# Patient Record
Sex: FEMALE | Race: WHITE | NOT HISPANIC OR LATINO
[De-identification: names, ages, dates, MRNs, and addresses within clinical notes are randomized per-mention and may not be internally consistent; named-entity substitution may affect disease eponyms.]

---

## 2023-09-22 ENCOUNTER — APPOINTMENT (OUTPATIENT)
Dept: ORTHOPEDIC SURGERY | Facility: CLINIC | Age: 68
End: 2023-09-22
Payer: COMMERCIAL

## 2023-09-22 VITALS — HEIGHT: 62 IN | WEIGHT: 190 LBS | BODY MASS INDEX: 34.96 KG/M2

## 2023-09-22 DIAGNOSIS — Z78.9 OTHER SPECIFIED HEALTH STATUS: ICD-10-CM

## 2023-09-22 PROCEDURE — 73564 X-RAY EXAM KNEE 4 OR MORE: CPT | Mod: 50

## 2023-09-22 PROCEDURE — 99203 OFFICE O/P NEW LOW 30 MIN: CPT

## 2023-11-10 ENCOUNTER — APPOINTMENT (OUTPATIENT)
Dept: ORTHOPEDIC SURGERY | Facility: CLINIC | Age: 68
End: 2023-11-10
Payer: COMMERCIAL

## 2023-11-10 VITALS — WEIGHT: 190 LBS | BODY MASS INDEX: 34.96 KG/M2 | HEIGHT: 62 IN

## 2023-11-10 PROCEDURE — 99213 OFFICE O/P EST LOW 20 MIN: CPT

## 2024-01-12 ENCOUNTER — APPOINTMENT (OUTPATIENT)
Dept: ORTHOPEDIC SURGERY | Facility: CLINIC | Age: 69
End: 2024-01-12
Payer: COMMERCIAL

## 2024-01-12 VITALS — BODY MASS INDEX: 34.96 KG/M2 | WEIGHT: 190 LBS | HEIGHT: 62 IN

## 2024-01-12 PROCEDURE — 99214 OFFICE O/P EST MOD 30 MIN: CPT

## 2024-01-12 RX ORDER — MELOXICAM 15 MG/1
15 TABLET ORAL DAILY
Qty: 30 | Refills: 1 | Status: COMPLETED | COMMUNITY
Start: 2024-01-12 | End: 2024-03-12

## 2024-01-12 NOTE — HISTORY OF PRESENT ILLNESS
[3] : 3 [0] : 0 [Dull/Aching] : dull/aching [Intermittent] : intermittent [Household chores] : household chores [Leisure] : leisure [Nothing helps with pain getting better] : Nothing helps with pain getting better [Standing] : standing [Walking] : walking [de-identified] : 9/22/23: Patient is here for bilateral knee pain that began over 5 years ago, not due to injury. Right is worse than left. No swelling. Pain is anterior. Clicking/buckling with walking/standing. Worsens with lateral movement/bending. No prior injury. At the time where pain began, sent for PT - never tried. Never tried CSI or gel injections. Takes Leonel ISSA.   11/10/23 pt comes in to follow up on both her knees. She states her condition has improved since last visit. She is doing physical therapy 2x a week @ [facility]   01/12/2024: here for a follow up visit for BL knees. States pain has improved. Going to PT.  [] : no [FreeTextEntry1] : bilateral knees

## 2024-01-12 NOTE — IMAGING
[de-identified] : Bilat knee No effusion, no warmth, no ecchymosis Medial joint line tenderness to palpation  Range of motion 0-130 with mild anterior crepitus 5/5 quadriceps and hamstring strength Ligamentously stable Motor and sensory intact distally Non antalgic gait Negative Santosh

## 2024-01-12 NOTE — ASSESSMENT
[FreeTextEntry1] : Estimation of chronic bilateral arthritis.  Making small incremental improvements with physical therapy.  Prescription renewed.  Prescription meloxicam sent to the pharmacy to alleviate the night symptoms.  She is hesitant about any cortisone or viscosupplementation.  Follow-up in 3 months to reassess   The patient's current medication management of their orthopedic diagnosis was reviewed today:(1) We discussed a comprehensive treatment plan that included pharmaceutical management involving the use of prescription medications. (2) There is a moderate risk of morbidity with further treatment, especially from use of prescription strength medications and possible side effects of these medications which include upset stomach with oral medications, skin reactions to topical medications and cardiac/renal/diabetes issues with long term use. (3) I recommended that the patient follow-up with their medical physician to discuss any significant specific potential issues with long term medication use such as interactions with current medications or with exacerbation of underlying medical comorbidities. (4) The benefits and risks associated with use of injectable, oral or topical, prescription and over the counter anti-inflammatory medications were discussed with the patient. The patient voiced understanding of the risks including but not limited to bleeding, stroke, kidney dysfunction, heart disease, and were referred to the black box warning label for further information.

## 2024-05-10 ENCOUNTER — APPOINTMENT (OUTPATIENT)
Dept: ORTHOPEDIC SURGERY | Facility: CLINIC | Age: 69
End: 2024-05-10
Payer: COMMERCIAL

## 2024-05-10 DIAGNOSIS — M17.11 UNILATERAL PRIMARY OSTEOARTHRITIS, RIGHT KNEE: ICD-10-CM

## 2024-05-10 DIAGNOSIS — M17.12 UNILATERAL PRIMARY OSTEOARTHRITIS, LEFT KNEE: ICD-10-CM

## 2024-05-10 DIAGNOSIS — E66.9 OBESITY, UNSPECIFIED: ICD-10-CM

## 2024-05-10 PROCEDURE — 99213 OFFICE O/P EST LOW 20 MIN: CPT

## 2024-05-10 NOTE — IMAGING
[de-identified] : Bilat knee: No effusion, no warmth, no ecchymosis Medial joint line tenderness to palpation  Range of motion 0-120 with mild anterior crepitus 5/5 quadriceps and hamstring strength Ligamentously stable Motor and sensory intact distally Non antalgic gait Negative Santosh

## 2024-05-10 NOTE — ASSESSMENT
[FreeTextEntry1] : Presents for follow-up bilateral arthritis.  Has not been on any medications and no longer in therapy.  Symptoms are manageable.  She will contact me as needed at this time.  Discussed corticosteroid injection and viscosupplementation if needed

## 2024-05-10 NOTE — HISTORY OF PRESENT ILLNESS
[3] : 3 [0] : 0 [Dull/Aching] : dull/aching [Intermittent] : intermittent [Household chores] : household chores [Leisure] : leisure [Nothing helps with pain getting better] : Nothing helps with pain getting better [Standing] : standing [Walking] : walking [de-identified] : 9/22/23: Patient is here for bilateral knee pain that began over 5 years ago, not due to injury. Right is worse than left. No swelling. Pain is anterior. Clicking/buckling with walking/standing. Worsens with lateral movement/bending. No prior injury. At the time where pain began, sent for PT - never tried. Never tried CSI or gel injections. Takes Leonel ISSA.   11/10/23 pt comes in to follow up on both her knees. She states her condition has improved since last visit. She is doing physical therapy 2x a week @ [facility]   01/12/2024: here for a follow up visit for BL knees. States pain has improved. Going to PT.  5.10.24 Patient here for follow up of erlinda knee pain. feels improvement since last visit  [] : no [FreeTextEntry1] : bilateral knees